# Patient Record
Sex: MALE | Race: OTHER
[De-identification: names, ages, dates, MRNs, and addresses within clinical notes are randomized per-mention and may not be internally consistent; named-entity substitution may affect disease eponyms.]

---

## 2021-06-25 ENCOUNTER — HOSPITAL ENCOUNTER (EMERGENCY)
Dept: HOSPITAL 56 - MW.ED | Age: 31
Discharge: HOME | End: 2021-06-25
Payer: SELF-PAY

## 2021-06-25 DIAGNOSIS — K29.70: Primary | ICD-10-CM

## 2021-06-25 PROCEDURE — 99284 EMERGENCY DEPT VISIT MOD MDM: CPT

## 2021-06-25 NOTE — EDM.PDOC
ED HPI GENERAL MEDICAL PROBLEM





- General


Chief Complaint: Abdominal Pain


Stated Complaint: CHEST PAIN, VOMITING


Time Seen by Provider: 06/25/21 03:48





- History of Present Illness


INITIAL COMMENTS - FREE TEXT/NARRATIVE: 





History of present illness:


[] The patient has abdominal pain for 1 month.  In the morning he throws up.  He

 has been having abdominal pain and throwing up every morning for more than a 

month.  Yesterday went to an urgent care center.  The urgent care center said he

 needed to go quickly to the emergency department.  The emergency department did

 a work-up that he says included lab and CAT scan and put him on an H2 blocker 

and PPI.  This morning he woke up and he still sick feels like he wants to throw

 up and has epigastric pain.  Its moderately severe.  He and his significant 

other said that they have a friend that had a heart attack and was misdiagnosed 

in [place] so they came to [place] because he did not trust them.  This 

anecdotal information really appears to have nothing new with this patient or 

his work-up.  The plan is to evaluate the patient thoroughly and see what his 

cause of his recurrent symptoms are as best I can however this would include 

getting records from [place] so I do not do any redundant tests.





Review of systems: 


As per history of present illness and below otherwise all systems reviewed and 

negative.





Past medical history: 


As per history of present illness and as reviewed below otherwise 

noncontributory.





Surgical history: 


As per history of present illness and as reviewed below otherwise 

noncontributory.





Social history: 


No reported history of drug or alcohol abuse.





Family history: 


As per history of present illness and as reviewed below otherwise 

noncontributory.





Physical exam:


Constitutional - well developed, well-nourished and in no acute distress


HEENT - normocephalic, no evidence of trauma - external nose and mouth normal - 

no mass in neck and no JVD - mucosae moist





EYES - full EOM, PERRL, no icterus - no evidence of inflammation, injection, or 

drainage





Respiratory - no respiratory distress, equal bilateral expansion, lungs clear to

 auscultation and no abnormal lung sounds





Cardiovascular - Regular Rhythm with S1 and S2 appreciated and no murmur, gallop

 or rub.





GI - abdomen soft without distension or organomegaly -minimal tenderness in 

epigastrium-normal bowel sounds - no guard or rebound





Musculoskeletal  no gross deformity of long bones or joints - no tenderness, 

swelling or edema





Neurologic - Alert and oriented times four - CN II-XII grossly intact - motor 

sensory and coordination symmetrically normal





Psychiatric - appropriate mood and affect with normal thought content





Hematologic - No petechiae or purpura - mucosa appropriate color and sclera not 

pale - normal nail bed color and refill





Integument - no rash or evidence of trauma - normal turgor





Diagnostics:


[]





Therapeutics:


[]





Impression: 


[]





Plan:


[]





Definitive disposition and diagnosis as appropriate pending reevaluation and 

review of above.











- Related Data


                                    Allergies











Allergy/AdvReac Type Severity Reaction Status Date / Time


 


No Known Allergies Allergy   Verified 06/25/21 03:54











Home Meds: 


                                    Home Meds





Ondansetron [Zofran ODT] 4 mg PO Q6H PRN #10 tab.dis 06/25/21 [Rx]


Sucralfate [Carafate] 1 gm PO Q6HR #90 tab 06/25/21 [Rx]


Sucralfate [Carafate] 1 gm PO Q6HR #90 tab 06/25/21 [Rx]











Past Medical History





- Past Health History


Medical/Surgical History: Denies Medical/Surgical History





Social & Family History





- Family History


Family Medical History: No Pertinent Family History





- Tobacco Use


Tobacco Use Status *Q: Never Tobacco User





- Recreational Drug Use


Recreational Drug Use: No





ED ROS GENERAL





- Review of Systems


Review Of Systems: Comprehensive ROS is negative, except as noted in HPI.





ED EXAM, GENERAL





- Physical Exam


Exam: See Below


Free Text/Narrative:: 





My physical exam is in the HPI





Course





- Vital Signs


Text/Narrative:: 





04 50 4 AM I reviewed the patient's records from [place] and he had a rather 

thorough work-up.  He had a CT and labs including lipase and amylase.  They made

 a diagnosis of possible peptic ulcer disease and certainly hyperacidity and 

gastritis.  The therapy was appropriate.  They also noted that patient drinks 

too much alcohol and recommended that he taper or cut that out.





They mention the possibility of upper GI endoscopy which I think would have been

 a good idea.  He does not seem to have had a referral as they probably do not 

have arrangements to do that in [place].





I am glad antiemetics and Carafate and recommend the patient follow-up for 

endoscopy.


Last Recorded V/S: 


                                Last Vital Signs











Temp  36.4 C   06/25/21 03:49


 


Pulse  64   06/25/21 03:49


 


Resp  15   06/25/21 03:49


 


BP  118/70   06/25/21 03:49


 


Pulse Ox  100   06/25/21 03:49














- Orders/Labs/Meds


Meds: 


Medications














Discontinued Medications














Generic Name Dose Route Start Last Admin





  Trade Name Freq  PRN Reason Stop Dose Admin


 


Al Hydroxide/Mg Hydroxide 15  0 ml  06/25/21 03:57  06/25/21 04:12





ml/ Metoclopramide HCl 5 mg/  PO  06/25/21 03:58  1 each





Lidocaine HCl 5 ml  ONETIME ONE   Administration


 


Ondansetron HCl  4 mg  06/25/21 03:57  06/25/21 04:12





  Ondansetron 4 Mg Tab.Dis  PO  06/25/21 03:58  4 mg





  ONETIME ONE   Administration














Departure





- Departure


Time of Disposition: 04:55


Disposition: Home, Self-Care 01


Condition: Good


Clinical Impression: 


 Gastritis








- Discharge Information


Prescriptions: 


Sucralfate [Carafate] 1 gm PO Q6HR #90 tab


Ondansetron [Zofran ODT] 4 mg PO Q6H PRN #10 tab.dis


 PRN Reason: Nausea/Vomiting


Instructions:  Gastritis, Adult, Easy-to-Read


Referrals: 


PCP,None [Primary Care Provider] - 


Forms:  ED Department Discharge


Additional Instructions: 








Use an antacid such as Maalox or Tums if you need more than what we have given 

you.  Continue the 2 medicines recommended by the prior physician.  Add the new 

prescription that was sent to the pharmacy.





He needs to schedule an upper GI endoscopy to see if he have an ulcer.  In [facility] that is done by the general surgeons.





[facility]


[address]


[address] [place] [address], [address]


[place] [address]


Phone: [phone]








The following information is given to patients seen in the emergency department 

who are being discharged to home. This information is to outline your options 

for follow-up care. We provide all patients seen in our emergency department 

with a follow-up referral.





The need for follow-up, as well as the timing and circumstances, are variable 

depending upon the specifics of your emergency department visit.





If you don't have a primary care physician on staff, we will provide you with a 

referral. We always advise you to contact your personal physician following an 

emergency department visit to inform them of the circumstance of the visit and 

for follow-up with them and/or the need for any referrals to a consulting 

specialist.





The emergency department will also refer you to a specialist when appropriate. 

This referral assures that you have the opportunity for follow-up care with a 

specialist. All of these measure are taken in an effort to provide you with 

optimal care, which includes your follow-up.





Under all circumstances we always encourage you to contact your private 

physician who remains a resource for coordinating your care. When calling for 

follow-up care, please make the office aware that this follow-up is from your 

recent emergency room visit. If for any reason you are refused follow-up, please

contact the [facility] Emergency Department

at [phone] and asked to speak to the emergency department charge nurse.








Sepsis Event Note (ED)





- Evaluation


Sepsis Screening Result: No Definite Risk





- Focused Exam


Vital Signs: 


                                   Vital Signs











  Temp Pulse Resp BP Pulse Ox


 


 06/25/21 03:49  36.4 C  64  15  118/70  100